# Patient Record
Sex: MALE | Race: BLACK OR AFRICAN AMERICAN | URBAN - METROPOLITAN AREA
[De-identification: names, ages, dates, MRNs, and addresses within clinical notes are randomized per-mention and may not be internally consistent; named-entity substitution may affect disease eponyms.]

---

## 2024-04-03 ENCOUNTER — OFFICE VISIT (OUTPATIENT)
Dept: PRIMARY CARE CLINIC | Age: 29
End: 2024-04-03
Payer: MEDICAID

## 2024-04-03 VITALS
WEIGHT: 243 LBS | DIASTOLIC BLOOD PRESSURE: 78 MMHG | SYSTOLIC BLOOD PRESSURE: 140 MMHG | OXYGEN SATURATION: 98 % | HEART RATE: 97 BPM | HEIGHT: 71 IN | BODY MASS INDEX: 34.02 KG/M2

## 2024-04-03 DIAGNOSIS — I83.813 VARICOSE VEINS OF BOTH LOWER EXTREMITIES WITH PAIN: ICD-10-CM

## 2024-04-03 DIAGNOSIS — N64.4 BREAST TENDERNESS IN MALE: ICD-10-CM

## 2024-04-03 DIAGNOSIS — S91.001A ANKLE WOUND, RIGHT, INITIAL ENCOUNTER: Primary | ICD-10-CM

## 2024-04-03 DIAGNOSIS — Z13.0 SCREENING FOR DEFICIENCY ANEMIA: ICD-10-CM

## 2024-04-03 DIAGNOSIS — Z13.1 SCREENING FOR DIABETES MELLITUS: ICD-10-CM

## 2024-04-03 DIAGNOSIS — Z13.29 SCREENING FOR THYROID DISORDER: ICD-10-CM

## 2024-04-03 DIAGNOSIS — Z13.220 SCREENING FOR LIPID DISORDERS: ICD-10-CM

## 2024-04-03 PROCEDURE — 99205 OFFICE O/P NEW HI 60 MIN: CPT | Performed by: NURSE PRACTITIONER

## 2024-04-03 SDOH — ECONOMIC STABILITY: FOOD INSECURITY: WITHIN THE PAST 12 MONTHS, YOU WORRIED THAT YOUR FOOD WOULD RUN OUT BEFORE YOU GOT MONEY TO BUY MORE.: NEVER TRUE

## 2024-04-03 SDOH — ECONOMIC STABILITY: HOUSING INSECURITY
IN THE LAST 12 MONTHS, WAS THERE A TIME WHEN YOU DID NOT HAVE A STEADY PLACE TO SLEEP OR SLEPT IN A SHELTER (INCLUDING NOW)?: NO

## 2024-04-03 SDOH — ECONOMIC STABILITY: FOOD INSECURITY: WITHIN THE PAST 12 MONTHS, THE FOOD YOU BOUGHT JUST DIDN'T LAST AND YOU DIDN'T HAVE MONEY TO GET MORE.: NEVER TRUE

## 2024-04-03 SDOH — ECONOMIC STABILITY: INCOME INSECURITY: HOW HARD IS IT FOR YOU TO PAY FOR THE VERY BASICS LIKE FOOD, HOUSING, MEDICAL CARE, AND HEATING?: NOT HARD AT ALL

## 2024-04-03 ASSESSMENT — PATIENT HEALTH QUESTIONNAIRE - PHQ9
1. LITTLE INTEREST OR PLEASURE IN DOING THINGS: NOT AT ALL
SUM OF ALL RESPONSES TO PHQ9 QUESTIONS 1 & 2: 0
2. FEELING DOWN, DEPRESSED OR HOPELESS: NOT AT ALL
SUM OF ALL RESPONSES TO PHQ QUESTIONS 1-9: 0

## 2024-04-03 ASSESSMENT — ENCOUNTER SYMPTOMS
ABDOMINAL PAIN: 0
SHORTNESS OF BREATH: 0
COUGH: 0

## 2024-04-03 NOTE — PROGRESS NOTES
General: Skin is warm and dry.   Neurological:      Mental Status: He is alert and oriented to person, place, and time.   Psychiatric:         Behavior: Behavior normal.         Thought Content: Thought content normal.         Judgment: Judgment normal.     BP (!) 142/84   Pulse 97   Ht 1.803 m (5' 11\")   Wt 110.2 kg (243 lb)   SpO2 98%   BMI 33.89 kg/m²     Assessment:       Diagnosis Orders   1. Ankle wound, right, initial encounter  [facility]      2. Breast tenderness in male  US BREAST LIMITED LEFT    [facility] W OR WO CAD LEFT      3. Varicose veins of both lower extremities with pain  [facility] - Afia Faith MD, Vascular Surgery, [place]      4. Screening for diabetes mellitus  Comprehensive Metabolic Panel    Hemoglobin A1C      5. Screening for deficiency anemia  CBC      6. Screening for lipid disorders  Lipid Panel      7. Screening for thyroid disorder  TSH with Reflex                Plan:      Return in about 1 month (around 5/3/2024) for recheck.    Ankle wound- Rx given for referral to wound care, follow up as needed  Breast tenderness- Rx given for mammogram/US. Follow up pending results  Varicose veins- Continue compression stockings. Rx given for referral to Dr. Faith. Follow up as needed  - Continue diet/exercise. Monitor BP daily and notify office if consistently greater than 130/80. Rx given for annual labs. Follow up in one month for annual exam.    Orders Placed This Encounter   Procedures   • US BREAST LIMITED LEFT     Standing Status:   Future     Standing Expiration Date:   4/3/2025   • [facility] W OR WO CAD LEFT     Standing Status:   Future     Standing Expiration Date:   6/3/2025   • Comprehensive Metabolic Panel     Standing Status:   Future     Standing Expiration Date:   4/3/2025   • CBC     Standing Status:   Future     Standing Expiration Date:   4/3/2025   • Lipid Panel     Standing Status:   Future     Standing Expiration

## 2024-04-08 NOTE — DISCHARGE INSTRUCTIONS
[facility] -Phone: [phone] Fax: [phone]   Visit  Discharge Instructions / Physician Orders    DATE: 4/9/2024     Home Care: N/A     SUPPLIES ORDERED THRU: N/A     Wound Location: Right Medial Ankle     Cleanse as normal     Dressing Orders: Compression socks on during the day and off at night. Moisturize area with non-fragrant moisturizer     Frequency: DAILY     Additional Orders: Increase protein to diet (meat, cheese, eggs, fish, peanut butter, nuts and beans)  WHEN SITTING DOWN; ELEVATE LEGS    Dr Delos Reyes[facility] [address], [address]. [place] [address] [phone]    Your next appointment with Wound Care Center is as needed     (Please note your next appointment above and if you are unable to keep, kindly give a 24 hour notice. Thank you.)  If more than 15 min late we cannot guarantee you will be seen due to clinician schedule  Per Policy, Excessive cancellation will call for dismissal from program.     If you experience any of the following, please call the Wound Care Center during business hours:  [phone]     * Increase in Pain  * Temperature over 101  * Increase in drainage from your wound  * Drainage with a foul odor  * Bleeding  * Increase in swelling  * Need for compression bandage changes due to slippage, breakthrough drainage.     If you need medical attention outside of the business hours of the Wound Care Centers please contact your PCP or go to the an urgent care or emergency department     The information contained in the After Visit Summary has been reviewed with me, the patient and/or responsible adult, by my health care provider(s). I had the opportunity to ask questions regarding this information. I have elected to receive;      []After Visit Summary  [x]Comprehensive Discharge Instruction      Patient signature______________________________________Date:________  Electronically signed by Arthur Delos Reyes, MD on 4/9/2024 at 8:38

## 2024-04-09 ENCOUNTER — HOSPITAL ENCOUNTER (OUTPATIENT)
Dept: WOUND CARE | Age: 29
Discharge: HOME | End: 2024-04-09
Payer: MEDICAID

## 2024-04-09 VITALS
HEIGHT: 71 IN | SYSTOLIC BLOOD PRESSURE: 150 MMHG | HEART RATE: 90 BPM | DIASTOLIC BLOOD PRESSURE: 100 MMHG | RESPIRATION RATE: 18 BRPM | WEIGHT: 243 LBS | TEMPERATURE: 97 F | BODY MASS INDEX: 34.02 KG/M2

## 2024-04-09 PROBLEM — I87.331 STASIS DERMATITIS WITH ULCER OF RIGHT LOWER EXTREMITY DUE TO PERIPHERAL VENOUS HYPERTENSION (HCC): Status: ACTIVE | Noted: 2024-04-09

## 2024-04-09 PROCEDURE — 99212 OFFICE O/P EST SF 10 MIN: CPT

## 2024-04-09 PROCEDURE — 99203 OFFICE O/P NEW LOW 30 MIN: CPT | Performed by: SURGERY

## 2024-04-09 ASSESSMENT — PAIN SCALES - GENERAL: PAINLEVEL_OUTOF10: 0

## 2024-04-09 NOTE — PROGRESS NOTES
drainage noted;Dry;Intact 04/09/24 0839   Wound Cleansed Cleansed with saline 04/09/24 0839   Wound Length (cm) 0 cm 04/09/24 0839   Wound Width (cm) 0 cm 04/09/24 0839   Wound Depth (cm) 0 cm 04/09/24 0839   Wound Surface Area (cm^2) 0 cm^2 04/09/24 0839   Wound Volume (cm^3) 0 cm^3 04/09/24 0839   Post-Procedure Length (cm) 0 cm 04/09/24 0839   Post-Procedure Width (cm) 0 cm 04/09/24 0839   Post-Procedure Depth (cm) 0 cm 04/09/24 0839   Post-Procedure Surface Area (cm^2) 0 cm^2 04/09/24 0839   Post-Procedure Volume (cm^3) 0 cm^3 04/09/24 0839   Number of days: 0        Plan:     Treatment Note please see Discharge Instructions    Written patient dismissal instructions given to patient and signed by patient or POA.             Electronically signed by Arthur Delos Reyes, MD on 4/9/2024 at 8:47 AM

## 2024-05-03 ENCOUNTER — OFFICE VISIT (OUTPATIENT)
Dept: PRIMARY CARE CLINIC | Age: 29
End: 2024-05-03
Payer: MEDICAID

## 2024-05-03 VITALS
DIASTOLIC BLOOD PRESSURE: 80 MMHG | BODY MASS INDEX: 33.67 KG/M2 | SYSTOLIC BLOOD PRESSURE: 124 MMHG | WEIGHT: 241.4 LBS | RESPIRATION RATE: 18 BRPM | OXYGEN SATURATION: 97 % | HEART RATE: 69 BPM

## 2024-05-03 DIAGNOSIS — Z00.00 ENCOUNTER FOR GENERAL ADULT MEDICAL EXAMINATION W/O ABNORMAL FINDINGS: Primary | ICD-10-CM

## 2024-05-03 PROCEDURE — 99395 PREV VISIT EST AGE 18-39: CPT | Performed by: NURSE PRACTITIONER

## 2024-05-03 RX ORDER — CETIRIZINE HYDROCHLORIDE 5 MG/1
5 TABLET ORAL DAILY
COMMUNITY

## 2024-05-03 ASSESSMENT — ENCOUNTER SYMPTOMS
SHORTNESS OF BREATH: 0
BACK PAIN: 0
COUGH: 0
ABDOMINAL PAIN: 0

## 2024-05-03 NOTE — PROGRESS NOTES
[facility]  [facility]  [address] [place] [address] [address]  [address]  [facility] [address]  Dept: [phone]  Dept Fax: [phone]    Guero Baird is a 28 y.o. male who presentstoday for his medical conditions/complaints as noted below.  Guero Baird is c/o of  Chief Complaint   Patient presents with    Wound Check     Right ankle     Annual Exam           HPI:     Presents for annual exam  BP well controlled  Has lost 2lb since LOV    Right ankle wound has healed  Wound care did not need to follow    Following with Dr. Faith, had venous ablation today  Wearing compression stockings as directed  Will follow with her weekly through June    Has orders pending for annual labs and mammogram- plans to complete next week    Denies any other problems/concerns        No results found for: \"LABA1C\"          ( goal A1C is < 7)   No components found for: \"LABMICR\"  No components found for: \"LDLCHOLESTEROL\", \"LDLCALC\"    (goal LDL is <100)   No results found for: \"AST\", \"ALT\", \"BUN\", \"CR\"  BP Readings from Last 3 Encounters:   05/03/24 124/80   04/09/24 (!) 150/100   04/03/24 (!) 140/78          ([id number])    Past Medical History:   Diagnosis Date    HTN (hypertension)     Varicose vein of leg       History reviewed. No pertinent surgical history.    Family History   Problem Relation Age of Onset    High Blood Pressure Father     Atrial Fibrillation Maternal Grandfather     Alcohol Abuse Paternal Grandmother     Arthritis Paternal Grandmother           Social History     Tobacco Use    Smoking status: Never    Smokeless tobacco: Never   Substance Use Topics    Alcohol use: Yes     Alcohol/week: 5.0 standard drinks of alcohol     Types: 5 Drinks containing 0.5 oz of alcohol per week      Current Outpatient Medications   Medication Sig Dispense Refill    cetirizine (ZYRTEC) 5 MG tablet Take 1 tablet by mouth daily       No current facility-administered medications for this visit.     Allergies   Allergen